# Patient Record
Sex: MALE | Race: WHITE
[De-identification: names, ages, dates, MRNs, and addresses within clinical notes are randomized per-mention and may not be internally consistent; named-entity substitution may affect disease eponyms.]

---

## 2019-10-16 ENCOUNTER — HOSPITAL ENCOUNTER (EMERGENCY)
Dept: HOSPITAL 19 - COL.ER | Age: 64
Discharge: HOME | End: 2019-10-16
Payer: MEDICARE

## 2019-10-16 VITALS — WEIGHT: 205.47 LBS | HEIGHT: 70 IN | BODY MASS INDEX: 29.42 KG/M2

## 2019-10-16 VITALS — TEMPERATURE: 98.3 F | DIASTOLIC BLOOD PRESSURE: 63 MMHG | HEART RATE: 88 BPM | SYSTOLIC BLOOD PRESSURE: 139 MMHG

## 2019-10-16 DIAGNOSIS — I10: ICD-10-CM

## 2019-10-16 DIAGNOSIS — Z79.82: ICD-10-CM

## 2019-10-16 DIAGNOSIS — I25.10: ICD-10-CM

## 2019-10-16 DIAGNOSIS — E11.9: ICD-10-CM

## 2019-10-16 DIAGNOSIS — S76.312A: Primary | ICD-10-CM

## 2019-10-16 DIAGNOSIS — Z79.02: ICD-10-CM

## 2019-10-16 DIAGNOSIS — F17.210: ICD-10-CM
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
## 2019-10-16 DIAGNOSIS — Z79.84: ICD-10-CM

## 2019-10-16 DIAGNOSIS — Z95.9: ICD-10-CM

## 2020-03-02 ENCOUNTER — HOSPITAL ENCOUNTER (OUTPATIENT)
Dept: HOSPITAL 19 - SDCO | Age: 65
Discharge: HOME | End: 2020-03-02
Attending: ORTHOPAEDIC SURGERY
Payer: MEDICARE

## 2020-03-02 VITALS — BODY MASS INDEX: 31.14 KG/M2 | HEIGHT: 68 IN | WEIGHT: 205.47 LBS

## 2020-03-02 VITALS — TEMPERATURE: 97.6 F | SYSTOLIC BLOOD PRESSURE: 158 MMHG | HEART RATE: 70 BPM | DIASTOLIC BLOOD PRESSURE: 66 MMHG

## 2020-03-02 VITALS — DIASTOLIC BLOOD PRESSURE: 72 MMHG | HEART RATE: 67 BPM | SYSTOLIC BLOOD PRESSURE: 146 MMHG

## 2020-03-02 VITALS — TEMPERATURE: 98.4 F | SYSTOLIC BLOOD PRESSURE: 137 MMHG | DIASTOLIC BLOOD PRESSURE: 64 MMHG | HEART RATE: 72 BPM

## 2020-03-02 DIAGNOSIS — Z79.02: ICD-10-CM

## 2020-03-02 DIAGNOSIS — Z79.899: ICD-10-CM

## 2020-03-02 DIAGNOSIS — M86.9: Primary | ICD-10-CM

## 2020-03-02 DIAGNOSIS — Z79.82: ICD-10-CM

## 2020-03-02 DIAGNOSIS — I96: ICD-10-CM

## 2020-03-02 DIAGNOSIS — I10: ICD-10-CM

## 2020-03-02 DIAGNOSIS — F17.210: ICD-10-CM

## 2020-03-02 DIAGNOSIS — L89.894: ICD-10-CM

## 2020-03-02 DIAGNOSIS — E11.52: ICD-10-CM

## 2020-03-02 DIAGNOSIS — Z79.84: ICD-10-CM

## 2020-03-02 NOTE — NUR
Patient tolerates water and muffin without any nausea. Patient reports right
foot is still numb and denies pain.

## 2020-03-02 NOTE — NUR
Dismissal instructions gone over with patient and patient's daughter in law.
Both verbalize understanding and all questions answered.

## 2020-03-02 NOTE — NUR
Patient discharged to home to private vehicle via wheelchair with ice pack and
surgical walking boot in place. Patient transfered from wheelchair into
private vehicle without any complications. Patient and family leave thanking
staff for services.

## 2020-03-02 NOTE — NUR
Patient arrives back to [facility] alert, denies pain or nausea. Patient reports
right foot is numb, with minimal movement noted. Dressing on right foot is
clean, dry, intact. Patient's daughter in law at bedside. Patient monitor
applied, vitals stable. Patient given water and muffin.

## 2021-11-17 ENCOUNTER — HOSPITAL ENCOUNTER (EMERGENCY)
Dept: HOSPITAL 19 - COL.ER | Age: 66
Discharge: LEFT BEFORE DISCHARGE | End: 2021-11-17
Attending: EMERGENCY MEDICINE
Payer: MEDICARE

## 2021-11-17 VITALS — HEIGHT: 69.02 IN | WEIGHT: 205.47 LBS | BODY MASS INDEX: 30.43 KG/M2

## 2021-11-17 VITALS — TEMPERATURE: 98 F

## 2021-11-17 VITALS — HEART RATE: 76 BPM | SYSTOLIC BLOOD PRESSURE: 162 MMHG | DIASTOLIC BLOOD PRESSURE: 89 MMHG

## 2021-11-17 DIAGNOSIS — Z95.5: ICD-10-CM

## 2021-11-17 DIAGNOSIS — I10: ICD-10-CM

## 2021-11-17 DIAGNOSIS — Z79.899: ICD-10-CM

## 2021-11-17 DIAGNOSIS — E11.621: Primary | ICD-10-CM

## 2021-11-17 DIAGNOSIS — I25.10: ICD-10-CM

## 2021-11-17 DIAGNOSIS — I25.2: ICD-10-CM

## 2021-11-17 DIAGNOSIS — I73.9: ICD-10-CM

## 2021-11-17 DIAGNOSIS — R65.10: ICD-10-CM

## 2021-11-17 DIAGNOSIS — Z79.84: ICD-10-CM

## 2021-11-17 DIAGNOSIS — Z79.82: ICD-10-CM

## 2021-11-17 DIAGNOSIS — D72.829: ICD-10-CM

## 2021-11-17 LAB
ALBUMIN SERPL-MCNC: 3.6 GM/DL (ref 3.4–4.8)
ALP SERPL-CCNC: 61 U/L (ref 40–150)
ALT SERPL-CCNC: 14 U/L (ref 0–55)
ANION GAP SERPL CALC-SCNC: 11 MMOL/L (ref 7–16)
AST SERPL-CCNC: 14 U/L (ref 5–34)
BASOPHILS # BLD: 0 K/MM3 (ref 0–0.2)
BASOPHILS NFR BLD AUTO: 0.3 % (ref 0–2)
BILIRUB SERPL-MCNC: 0.3 MG/DL (ref 0.2–1.2)
BUN SERPL-MCNC: 12 MG/DL (ref 8–26)
CALCIUM SERPL-MCNC: 9.5 MG/DL (ref 8.4–10.2)
CHLORIDE SERPL-SCNC: 107 MMOL/L (ref 98–107)
CO2 SERPL-SCNC: 18 MMOL/L (ref 23–31)
CREAT SERPL-SCNC: 1.04 MG/DL (ref 0.72–1.25)
CRP SERPL-MCNC: 6.88 MG/DL (ref 0–0.5)
EOSINOPHIL # BLD: 0.3 K/MM3 (ref 0–0.7)
EOSINOPHIL NFR BLD: 2 % (ref 0–4)
ERYTHROCYTE [DISTWIDTH] IN BLOOD BY AUTOMATED COUNT: 12.9 % (ref 11.5–14.5)
GLUCOSE SERPL-MCNC: 108 MG/DL (ref 70–99)
GRANULOCYTES # BLD AUTO: 65.6 % (ref 42.2–75.2)
HCT VFR BLD AUTO: 41.2 % (ref 42–52)
HGB BLD-MCNC: 13.9 G/DL (ref 13.5–18)
LYMPHOCYTES # BLD: 3.1 K/MM3 (ref 1.2–3.4)
LYMPHOCYTES NFR BLD: 22.8 % (ref 20–51)
MCH RBC QN AUTO: 33 PG (ref 27–31)
MCHC RBC AUTO-ENTMCNC: 34 G/DL (ref 33–37)
MCV RBC AUTO: 97 FL (ref 80–100)
MONOCYTES # BLD: 1.2 K/MM3 (ref 0.1–0.6)
MONOCYTES NFR BLD AUTO: 8.6 % (ref 1.7–9.3)
NEUTROPHILS # BLD: 8.8 K/MM3 (ref 1.4–6.5)
PLATELET # BLD AUTO: 350 K/MM3 (ref 130–400)
PMV BLD AUTO: 9.4 FL (ref 7.4–10.4)
POTASSIUM SERPL-SCNC: 4.7 MMOL/L (ref 3.5–4.5)
PROT SERPL-MCNC: 7.8 GM/DL (ref 6.2–8.1)
RBC # BLD AUTO: 4.23 M/MM3 (ref 4.2–5.6)
SODIUM SERPL-SCNC: 136 MMOL/L (ref 136–145)

## 2022-04-28 ENCOUNTER — HOSPITAL ENCOUNTER (OUTPATIENT)
Dept: HOSPITAL 19 - SDCO | Age: 67
Discharge: HOME | End: 2022-04-28
Attending: SURGERY
Payer: MEDICARE

## 2022-04-28 VITALS — SYSTOLIC BLOOD PRESSURE: 123 MMHG | DIASTOLIC BLOOD PRESSURE: 53 MMHG | HEART RATE: 72 BPM

## 2022-04-28 VITALS — SYSTOLIC BLOOD PRESSURE: 107 MMHG | DIASTOLIC BLOOD PRESSURE: 55 MMHG | HEART RATE: 69 BPM | TEMPERATURE: 97.5 F

## 2022-04-28 VITALS — HEART RATE: 64 BPM | DIASTOLIC BLOOD PRESSURE: 45 MMHG | SYSTOLIC BLOOD PRESSURE: 137 MMHG

## 2022-04-28 VITALS — DIASTOLIC BLOOD PRESSURE: 73 MMHG | TEMPERATURE: 98.1 F | SYSTOLIC BLOOD PRESSURE: 144 MMHG | HEART RATE: 72 BPM

## 2022-04-28 VITALS — WEIGHT: 193.35 LBS | BODY MASS INDEX: 29.3 KG/M2 | HEIGHT: 68 IN

## 2022-04-28 DIAGNOSIS — K43.9: Primary | ICD-10-CM

## 2022-04-28 DIAGNOSIS — E11.9: ICD-10-CM

## 2022-04-28 DIAGNOSIS — F17.210: ICD-10-CM

## 2022-04-28 PROCEDURE — C1781 MESH (IMPLANTABLE): HCPCS

## 2022-04-28 NOTE — NUR
1404  PT RETURNED TO BAY 1 VIA CART. ALERT AND ORIENTED. MONITORS ATTACHED,
INTERVALS AND ALARMS SET. PT DENIES NAUSEA OR PAIN. VSS. WATER AND MUFFIN
PROVIDED. OPERATIVE SITE CLEAN AND DRY.
 
1419  VSS. TOLERATING FOOD AND DRINK WELL. DENIES DISCOMFORT.
 
1424  VSS. PT UP TO RESTROOM, ABLE TO VOID.
 
1455  REVIEWED DISCHARGE INSTRUCTIONS
AND EDUACTION PACKET, ANSWERED ALL QUESTIONS. IV REMOVED WITHOUT COMPLICATION.
PT ALLOWED TO DRESS.
 
1510  PT TRANSFERED VIA WHEELCHAIR TO PERSONAL VEHICLE TO BE DRIVEN HOME BY
[facility].